# Patient Record
(demographics unavailable — no encounter records)

---

## 2024-11-11 NOTE — HISTORY OF PRESENT ILLNESS
[FreeTextEntry1] : Doesn't check BP at home.  No SOB with exertion. No CP. No lightheadedness (pre-syncope). Compliant with medications. He is only taking valsartan once daily. eGFR 53 (avg. of CKD-EPIcr & CKD-EPIcys). 2053 mg albuminuria. * No renal colic for > 10 years. * Obesity stable.   Previous history (): eGFR 53 (avg. of CKD-EPIcr & CKD-EPIcys). 2053 mg albuminuria. * No renal colic for > 10 years. * Obesity stable.   Previous history (): HTN uncontrolled. BP 140s at home. No SOB with exertion. No CP. No lightheadedness. Compliant with medications. * No renal colic for > 10 years. * He is still taking naprosyn twice daily for arthritis. * No significant swelling on amlodipine.  *eGFR 69. 2400 m albuminuria. * Still smoking.   Previous history (): eGFR 69 (avg. of CKD-EPIcr & CKD-EPIcys).  2448 mg albuminuria. * Doesn't check BP at home.  No SOB with exertion. No CP. No lightheadedness. Compliant with medications. * No renal colic for > 10 years. * He is still taking naprosyn twice daily for arthritis. * No significant swelling on amlodipine.  *  Previous history (): * He is still taking naprosyn twice daily for arthritis. He has been advised this may cause kidney disease or bleeding or other problems. * * HTN uncontrolled. Doesn't check at home.  with others doctors. No lightheadedness. No CP/SOB. Compliant with medications. * Borderline CKD stable. * He is now taking amlodipine and valsartan. * No renal colic for > 10 years.   Previous history (): * Smoking. * HTN uncontrolled. BP 130s at home. No lightheadedness. No CP/SOB. Compliant with medications. * Cr 1.02. eGFR 56 - 82. * He is still taking naprosyn twice daily for arthritis. * He is not taking valsartan and is on amlodipine.   Previous history (): * BP controlled.  No lightheadedness. No CP/SOB. Compliant with medications. On valsartan. * eGFR 57 (avg. of CKD-EPIcr & CKD-EPIcys). * He is attempting to limit naprosyn. * He took augmentin and doxycycline on his own 1 month ago for cellulitis which thas now resolved.   Previous history (): * He stopped amlodipine Doesn't check BP at home. No lightheadedness. No CP/SOB. Compliant with medications. *  eGFR 63 (EKFC eGFRcys and CKD-EPI eGFRcr). He is attempting to limit naprosyn. * u  Previous history (): * eGFR 54 based on cystatin C of 1.37 (EKFC eGFRcys equation, [facility] ). 1381 mg albuminuria. * HTN controlled. No lightheadedness. No CP/SOB. Compliant with medications. * DM improved off steroids. * Stable obesity. * HE STOPPED VALSARTAN BECAUSE HE THOUGHT IT MIGHT CAUSE CELLULITIS.   Previous history (): * HTN controlled. No lightheadedness. No CP/SOB. Compliant with medications. On valsartan and amlodipine. * 2010 albuminuria last visit. Cr 1.04.  * Stable obesity.  * DM improved off steroids. HGA1c < 4.   Previous history (): * HTN uncontrolled. Rare lightheadedness. No CP/SOB. Compliant with medications. Only on amlodipine 5. * He has been taking naproxen for arthritis and has been advised to reduce/stop. * 2 grams proteinuria.   Previous history (): * Unvaccinated. Got Covid 3 times. "Almost " the third time (2021). * No symptoms of kidney stones. * * HTN controlled. No lightheadedness. No CP/SOB. Compliant with medications. * Osteopolitis on right leg treated, followed by podiatrist. * 88 mg albuminuria.   Previous history (): * Albuminuria improved to 33 mg last visit. * HTN previously uncontrolled on amlodipine 5. He hasn't checked BP at home. He hasn't taoen amlodipine because he ran out. * No symptoms of kidney stones. * Asymtpomatic Covid infection in 2020. * He is following up closely with two podiatrists for pressure ulcers on legs and is being treated with doxycycline.  Previous history (): * His BP was 140s in April. He started amlodipine 5 but has not checked his BP. * CKD improved, creatinine decreased to 0.93 last year. * No symptoms of kidneys. * DM improved off steroids. HGA1c < 4.   Previous history (): * BP 140s at home. * CLAUS improved, creatinine normalized, 300 mg proteinuria. * DM reportedly improved off steroids. * No symptoms of kidney stones. Reportedly he was evaluated by urologist and ultrasound was negative.   Previous history (): * DM controlled off prednisone. * BP controlled last visit but he didn't check his BP at home. * Renal function normalized. * He has not yet had ultrasound. No symptoms of kidney stones.   Previous history (): He was last seen by me in . At the time, he was on chronic prednisone. Last year, he developed septic shock due to osteomyelitis with CLAUS at [facility]. His kidney function is "better" but reportedly not normal. He has not recently passed a kidney stone.  He does not recall a recent renal ultrasound.  His DM is reportedly now controlled as he has been off prednisone for 1 year.  PCP: Dr. Grant

## 2024-11-11 NOTE — PHYSICAL EXAM
[General Appearance - Alert] : alert [General Appearance - In No Acute Distress] : in no acute distress [Neck Appearance] : the appearance of the neck was normal [Neck Cervical Mass (___cm)] : no neck mass was observed [Jugular Venous Distention Increased] : there was no jugular-venous distention [Thyroid Diffuse Enlargement] : the thyroid was not enlarged [Thyroid Nodule] : there were no palpable thyroid nodules [Auscultation Breath Sounds / Voice Sounds] : lungs were clear to auscultation bilaterally [Heart Rate And Rhythm] : heart rate was normal and rhythm regular [Heart Sounds] : normal S1 and S2 [Heart Sounds Gallop] : no gallops [Murmurs] : no murmurs [Heart Sounds Pericardial Friction Rub] : no pericardial rub [Abdomen Soft] : soft [Abdomen Tenderness] : non-tender [] : no hepato-splenomegaly [Abdomen Mass (___ Cm)] : no abdominal mass palpated [Cervical Lymph Nodes Enlarged Posterior Bilaterally] : posterior cervical [Cervical Lymph Nodes Enlarged Anterior Bilaterally] : anterior cervical [Supraclavicular Lymph Nodes Enlarged Bilaterally] : supraclavicular [FreeTextEntry1] : trace pitting ankles

## 2024-11-11 NOTE — ASSESSMENT
[FreeTextEntry1] : --  * HTN uncontrolled.   * Increase valsartan to 160 bid.   * The patient's blood pressure was checked with the [employer] [id number] using the SPRINT trial protocol after sitting quietly in an empty room with arm supported, back supported, and feet on the floor for 5 minutes. The average of 3 readings were taken.  * A counseling information sheet on blood pressure and staying healthy has been given (which they have been instructed to read).  * The patient has been counseled to check their BP at home with an automatic arm cuff, write down the readings, and reach me directly on the phone immediately if they are persistently > 180 systolic or if SBP is less than 100 or if lightheadedness develops. They were counseled to bring in all blood pressure readings and medications next visit.  * The patient has been counseled that regular office follow-up (at least every 2-3 month for now) is important for monitoring and for their health, and that it is their responsibility to make follow up appointments.  * The patient also has been counseled that they must never stop or change any medications without discussing this with me (or another physician).  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  * Recheck labs.  * I discussed possible use of SGLT2i but he is concerned about side effects and would like to hold off currently.  * Therapies for kidney disease: blood pressure control; ARB; other evidence-based therapies including exercise, a plant-based lower oxalate diet, and 400 mcg folic acid daily  * Cardiovascular disease prevention: counseling on healthy diet, physical activity, weight loss, alcohol limitation, blood pressure control  * A counseling information sheet has been given. All their questions were answered.  * The patient has been counseled that chronic kidney disease is a significant condition and regular office followup with me (at least every 2-3 months for now) is important for monitoring and their health, and that it is their responsibility to make a follow up appointment.  * The patient has been counseled never to stop taking their medications without discussing it with me or another doctor.  * The patient has been counseled on avoiding NSAIDs.  * The patient has been counseled on risk of acute renal failure and instructed to immediately call and speak with me or go immediately to ER with any severe symptoms, nausea, vomiting, diarrhea, chest pain, or shortness of breath.  # Kidney stones.  * Continue high fluid intake.  # Type 2 DM now controlled off meds.  * Follow HGA1c  # Obesity.  * Weight loss.

## 2025-04-18 NOTE — ASSESSMENT
[FreeTextEntry1] : -- # Arthritis. * He has been counseled about significant risks of naprosyn including but not limited to kidney failure, GI bleeding, and death. He has been advised to stop if at all possible.  * Follow up with rheum.  * HTN controlled. * Continue amlodipine 5 mg, valsartan 160 bid.  * The patient's blood pressure was checked using the SPRINT trial protocol (with the [employer] [id number] or [employer]ox[employer] 73CT) after sitting quietly in an empty room with arm supported, back supported, and feet on the floor for 5 minutes. The average of 3 readings were taken. * A counseling information sheet on blood pressure and staying healthy has been given (which they have been instructed to read). * The patient has been counseled to check their BP at home with an automatic arm cuff, write down the readings, and reach me directly on the phone immediately if they are persistently > 180 systolic or if SBP is less than 100 or if lightheadedness develops. They were counseled to bring in all blood pressure readings and medications next visit. * The patient has been counseled that regular office follow-up (at least every 3 months for now) is important for monitoring and for their health, and that it is their responsibility to make follow up appointments. * The patient also has been counseled that they must never stop or change any medications without discussing this with me (or another physician).   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 Unclear whether he has DM (reportedly this was transient and due to steroids).  * Recheck labs.  * I discussed possible use of SGLT2i but he is concerned about side effects and would like to hold off currently.  * Therapies for kidney disease: blood pressure control; ARB; other evidence-based therapies including exercise, a plant-based lower oxalate diet, and 400 mcg folic acid daily  * Cardiovascular disease prevention: counseling on healthy diet, physical activity, weight loss, alcohol limitation, blood pressure control  * A counseling information sheet has been given. All their questions were answered.  * The patient has been counseled that chronic kidney disease is a significant condition and regular office followup with me (at least every 3 months for now) is important for monitoring and their health, and that it is their responsibility to make a follow up appointment.  * The patient has been counseled never to stop taking their medications without discussing it with me or another doctor.  * The patient has been counseled on avoiding NSAIDs.  * The patient has been counseled on risk of acute renal failure and instructed to immediately call and speak with me or go immediately to ER with any severe symptoms, nausea, vomiting, diarrhea, chest pain, or shortness of breath.  # Kidney stones.  * Continue high fluid intake.  # Type 2 DM now controlled off meds.  * Follow HGA1c next visit.   # Obesity.  * Weight loss.

## 2025-04-18 NOTE — PHYSICAL EXAM
[General Appearance - Alert] : alert [General Appearance - In No Acute Distress] : in no acute distress [Neck Appearance] : the appearance of the neck was normal [Neck Cervical Mass (___cm)] : no neck mass was observed [Jugular Venous Distention Increased] : there was no jugular-venous distention [Thyroid Diffuse Enlargement] : the thyroid was not enlarged [Thyroid Nodule] : there were no palpable thyroid nodules [Auscultation Breath Sounds / Voice Sounds] : lungs were clear to auscultation bilaterally [Heart Rate And Rhythm] : heart rate was normal and rhythm regular [Heart Sounds] : normal S1 and S2 [Heart Sounds Gallop] : no gallops [Murmurs] : no murmurs [Heart Sounds Pericardial Friction Rub] : no pericardial rub [Abdomen Soft] : soft [Abdomen Tenderness] : non-tender [Abdomen Mass (___ Cm)] : no abdominal mass palpated [] : no hepato-splenomegaly [Cervical Lymph Nodes Enlarged Posterior Bilaterally] : posterior cervical [Cervical Lymph Nodes Enlarged Anterior Bilaterally] : anterior cervical [Supraclavicular Lymph Nodes Enlarged Bilaterally] : supraclavicular [FreeTextEntry1] : trace pitting ankles

## 2025-04-18 NOTE — HISTORY OF PRESENT ILLNESS
[FreeTextEntry1] : Cellulitis in right leg 10 days ago treated with abx, now resolved. * eGFR 51 (avg. of CKD-EPIcr & CKD-EPIcys). * Following up with Dr. Reich for psoriatic/rheumatoid arthritis. * No renal colic for > 10 years. * Obesity stable. * BP controlled. No SOB with exertion. No CP. No lightheadedness (pre-syncope). Adherent to medications. *   Previous history (): Doesn't check BP at home.  No SOB with exertion. No CP. No lightheadedness (pre-syncope). Compliant with medications. He is only taking valsartan once daily. eGFR 53 (avg. of CKD-EPIcr & CKD-EPIcys). 2053 mg albuminuria. * No renal colic for > 10 years. * Obesity stable.   Previous history (): eGFR 53 (avg. of CKD-EPIcr & CKD-EPIcys). 2053 mg albuminuria. * No renal colic for > 10 years. * Obesity stable.   Previous history (): HTN uncontrolled. BP 140s at home. No SOB with exertion. No CP. No lightheadedness. Compliant with medications. * No renal colic for > 10 years. * He is still taking naprosyn twice daily for arthritis. * No significant swelling on amlodipine.  *eGFR 69. 2400 m albuminuria. * Still smoking.   Previous history (): eGFR 69 (avg. of CKD-EPIcr & CKD-EPIcys).  2448 mg albuminuria. * Doesn't check BP at home.  No SOB with exertion. No CP. No lightheadedness. Compliant with medications. * No renal colic for > 10 years. * He is still taking naprosyn twice daily for arthritis. * No significant swelling on amlodipine.  *  Previous history (): * He is still taking naprosyn twice daily for arthritis. He has been advised this may cause kidney disease or bleeding or other problems. * * HTN uncontrolled. Doesn't check at home.  with others doctors. No lightheadedness. No CP/SOB. Compliant with medications. * Borderline CKD stable. * He is now taking amlodipine and valsartan. * No renal colic for > 10 years.   Previous history (): * Smoking. * HTN uncontrolled. BP 130s at home. No lightheadedness. No CP/SOB. Compliant with medications. * Cr 1.02. eGFR 56 - 82. * He is still taking naprosyn twice daily for arthritis. * He is not taking valsartan and is on amlodipine.   Previous history (): * BP controlled.  No lightheadedness. No CP/SOB. Compliant with medications. On valsartan. * eGFR 57 (avg. of CKD-EPIcr & CKD-EPIcys). * He is attempting to limit naprosyn. * He took augmentin and doxycycline on his own 1 month ago for cellulitis which thas now resolved.   Previous history (): * He stopped amlodipine Doesn't check BP at home. No lightheadedness. No CP/SOB. Compliant with medications. *  eGFR 63 (EKFC eGFRcys and CKD-EPI eGFRcr). He is attempting to limit naprosyn. * u  Previous history (): * eGFR 54 based on cystatin C of 1.37 (EKFC eGFRcys equation, [facility] ). 1381 mg albuminuria. * HTN controlled. No lightheadedness. No CP/SOB. Compliant with medications. * DM improved off steroids. * Stable obesity. * HE STOPPED VALSARTAN BECAUSE HE THOUGHT IT MIGHT CAUSE CELLULITIS.   Previous history (): * HTN controlled. No lightheadedness. No CP/SOB. Compliant with medications. On valsartan and amlodipine. *  albuminuria last visit. Cr 1.04.  * Stable obesity.  * DM improved off steroids. HGA1c < 4.   Previous history (): * HTN uncontrolled. Rare lightheadedness. No CP/SOB. Compliant with medications. Only on amlodipine 5. * He has been taking naproxen for arthritis and has been advised to reduce/stop. * 2 grams proteinuria.   Previous history (): * Unvaccinated. Got Covid 3 times. "Almost " the third time (2021). * No symptoms of kidney stones. * * HTN controlled. No lightheadedness. No CP/SOB. Compliant with medications. * Osteopolitis on right leg treated, followed by podiatrist. * 88 mg albuminuria.   Previous history ([id number]): * Albuminuria improved to 33 mg last visit. * HTN previously uncontrolled on amlodipine 5. He hasn't checked BP at home. He hasn't taoen amlodipine because he ran out. * No symptoms of kidney stones. * Asymtpomatic Covid infection in 2020. * He is following up closely with two podiatrists for pressure ulcers on legs and is being treated with doxycycline.  Previous history (): * His BP was 140s in April. He started amlodipine 5 but has not checked his BP. * CKD improved, creatinine decreased to 0.93 last year. * No symptoms of kidneys. * DM improved off steroids. HGA1c < 4.   Previous history ([id number]): * BP 140s at home. * CLAUS improved, creatinine normalized, 300 mg proteinuria. * DM reportedly improved off steroids. * No symptoms of kidney stones. Reportedly he was evaluated by urologist and ultrasound was negative.   Previous history (): * DM controlled off prednisone. * BP controlled last visit but he didn't check his BP at home. * Renal function normalized. * He has not yet had ultrasound. No symptoms of kidney stones.   Previous history ([id number]): He was last seen by me in . At the time, he was on chronic prednisone. Last year, he developed septic shock due to osteomyelitis with CLAUS at [facility]. His kidney function is "better" but reportedly not normal. He has not recently passed a kidney stone.  He does not recall a recent renal ultrasound.  His DM is reportedly now controlled as he has been off prednisone for 1 year.  PCP: Dr. Grant